# Patient Record
Sex: FEMALE | Race: WHITE | Employment: PART TIME | URBAN - NONMETROPOLITAN AREA
[De-identification: names, ages, dates, MRNs, and addresses within clinical notes are randomized per-mention and may not be internally consistent; named-entity substitution may affect disease eponyms.]

---

## 2019-09-08 ENCOUNTER — HOSPITAL ENCOUNTER (EMERGENCY)
Age: 34
Discharge: HOME | End: 2019-09-08
Attending: EMERGENCY MEDICINE
Payer: COMMERCIAL

## 2019-09-08 VITALS
OXYGEN SATURATION: 100 % | TEMPERATURE: 98.3 F | WEIGHT: 180 LBS | RESPIRATION RATE: 16 BRPM | SYSTOLIC BLOOD PRESSURE: 126 MMHG | HEIGHT: 66 IN | DIASTOLIC BLOOD PRESSURE: 87 MMHG | BODY MASS INDEX: 28.93 KG/M2 | HEART RATE: 87 BPM

## 2019-09-08 DIAGNOSIS — M10.9 GOUTY ARTHRITIS: ICD-10-CM

## 2019-09-08 DIAGNOSIS — M79.671 RIGHT FOOT PAIN: Primary | ICD-10-CM

## 2019-09-08 PROCEDURE — 99283 EMERGENCY DEPT VISIT LOW MDM: CPT

## 2019-09-08 PROCEDURE — 6370000000 HC RX 637 (ALT 250 FOR IP): Performed by: EMERGENCY MEDICINE

## 2019-09-08 PROCEDURE — 6360000002 HC RX W HCPCS: Performed by: EMERGENCY MEDICINE

## 2019-09-08 RX ORDER — IBUPROFEN 400 MG/1
800 TABLET ORAL ONCE
Status: COMPLETED | OUTPATIENT
Start: 2019-09-08 | End: 2019-09-08

## 2019-09-08 RX ORDER — COLCHICINE 0.6 MG/1
1.2 TABLET ORAL ONCE
Status: COMPLETED | OUTPATIENT
Start: 2019-09-08 | End: 2019-09-08

## 2019-09-08 RX ORDER — DEXAMETHASONE SODIUM PHOSPHATE 10 MG/ML
10 INJECTION INTRAMUSCULAR; INTRAVENOUS ONCE
Status: COMPLETED | OUTPATIENT
Start: 2019-09-08 | End: 2019-09-08

## 2019-09-08 RX ORDER — COLCHICINE 0.6 MG/1
0.6 TABLET ORAL DAILY
Qty: 7 TABLET | Refills: 1 | Status: SHIPPED | OUTPATIENT
Start: 2019-09-08

## 2019-09-08 RX ORDER — IBUPROFEN 800 MG/1
800 TABLET ORAL EVERY 8 HOURS PRN
Qty: 21 TABLET | Refills: 0 | Status: SHIPPED | OUTPATIENT
Start: 2019-09-08 | End: 2019-09-15

## 2019-09-08 RX ADMIN — COLCHICINE 1.2 MG: 0.6 TABLET, FILM COATED ORAL at 22:38

## 2019-09-08 RX ADMIN — DEXAMETHASONE SODIUM PHOSPHATE 10 MG: 10 INJECTION, SOLUTION INTRAMUSCULAR; INTRAVENOUS at 22:38

## 2019-09-08 RX ADMIN — IBUPROFEN 800 MG: 400 TABLET, FILM COATED ORAL at 22:38

## 2019-09-08 ASSESSMENT — PAIN DESCRIPTION - PAIN TYPE: TYPE: ACUTE PAIN

## 2019-09-08 ASSESSMENT — PAIN DESCRIPTION - LOCATION: LOCATION: FOOT

## 2019-09-08 ASSESSMENT — PAIN DESCRIPTION - ORIENTATION: ORIENTATION: RIGHT

## 2019-09-08 ASSESSMENT — PAIN SCALES - GENERAL
PAINLEVEL_OUTOF10: 8
PAINLEVEL_OUTOF10: 7

## 2019-09-08 ASSESSMENT — PAIN DESCRIPTION - PROGRESSION: CLINICAL_PROGRESSION: NOT CHANGED

## 2019-09-09 NOTE — ED PROVIDER NOTES
pregnancy    Sexual activity: Yes     Partners: Male   Lifestyle    Physical activity:     Days per week: Not on file     Minutes per session: Not on file    Stress: Not on file   Relationships    Social connections:     Talks on phone: Not on file     Gets together: Not on file     Attends [employer] service: Not on file     Active member of club or organization: Not on file     Attends meetings of clubs or organizations: Not on file     Relationship status: Not on file    Intimate partner violence:     Fear of current or ex partner: Not on file     Emotionally abused: Not on file     Physically abused: Not on file     Forced sexual activity: Not on file   Other Topics Concern    Not on file   Social History Narrative    ** Merged History Encounter **          No current facility-administered medications for this encounter. Current Outpatient Medications   Medication Sig Dispense Refill    colchicine (COLCRYS) 0.6 MG tablet Take 1 tablet by mouth daily For acute gout flare 7 tablet 1    ibuprofen (ADVIL;MOTRIN) 800 MG tablet Take 1 tablet by mouth every 8 hours as needed for Pain 21 tablet 0    acetaminophen (TYLENOL) 500 MG tablet Take 1,000 mg by mouth every 4 hours as needed.  lansoprazole (PREVACID) 30 MG capsule Take 1 capsule by mouth daily for 15 doses. 15 capsule 0     Allergies   Allergen Reactions    Aspirin Itching    Augmentin [Amoxicillin-Pot Clavulanate]     Bactrim [Sulfamethoxazole-Trimethoprim]     Majorcin [Aspirin-Caffeine] Itching    Zoloft Itching    Fioricet [Butalbital-Apap-Caffeine] Nausea And Vomiting       REVIEW OF SYSTEMS  10 systems reviewed, pertinent positives per HPI otherwise noted to be negative. PHYSICAL EXAM  /87   Pulse 87   Temp 98.3 °F (36.8 °C) (Oral)   Resp 16   Ht 5' 6\" (1.676 m)   Wt 180 lb (81.6 kg)   LMP 09/06/2019   SpO2 100%   Breastfeeding? No   BMI 29.05 kg/m²   GENERAL APPEARANCE: Awake and alert. Cooperative.  No acute distress  HEAD: Normocephalic. Atraumatic. EYES: PERRL. EOM's grossly intact. ENT: Mucous membranes are moist.   NECK: Supple. HEART: RRR. No murmurs  LUNGS: Respirations nonlabored. Lungs are CTAB. EXTREMITIES: No peripheral edema. Moves all extremities equally. All extremities neurovascularly intact. Slight erythema surrounding 1st MTP joint of R foot w/ no significant edema, but TTP, painful ROM, no ankle edema/erythema, 2+ DP and PT pulses  SKIN: Warm and dry. No acute rashes. NEUROLOGICAL: Alert and oriented. No gross facial drooping. Strength 5/5, sensation intact. No truncal ataxia. nml speech  PSYCHIATRIC: Normal mood and affect. ED COURSE/MDM  Patient seen and evaluated. Old records reviewed. Labs and imaging reviewed and results discussed with patient. 31yo F w/ R foot/toe pain and reporting hx of gout, I encouraged patient to f/u w/ PCP and dread uric acid testing as an outpt, she would not be a typical gout patient, but reports the gout medicine helped in past, given decadron, colchicine, advil and the latter 2 for home, no current concern for septic joint, afebrile, VSS, discussed gout diet, see AVS for further d/c info, given rheum f/u as well. Strict return precautions given, will return if any worsening symptoms or new concerns, patient verbalized understanding of plan, felt comfortable going home. Orders Placed This Encounter   Medications    colchicine (COLCRYS) tablet 1.2 mg    dexamethasone (DECADRON) injection 10 mg    ibuprofen (ADVIL;MOTRIN) tablet 800 mg    colchicine (COLCRYS) 0.6 MG tablet     Sig: Take 1 tablet by mouth daily For acute gout flare     Dispense:  7 tablet     Refill:  1    ibuprofen (ADVIL;MOTRIN) 800 MG tablet     Sig: Take 1 tablet by mouth every 8 hours as needed for Pain     Dispense:  21 tablet     Refill:  0          CLINICAL IMPRESSION  1. Right foot pain    2.  Gouty arthritis        Blood pressure 126/87, pulse 87, temperature 98.3 °F (36.8 °C),